# Patient Record
Sex: FEMALE | Race: WHITE | NOT HISPANIC OR LATINO | Employment: FULL TIME | URBAN - METROPOLITAN AREA
[De-identification: names, ages, dates, MRNs, and addresses within clinical notes are randomized per-mention and may not be internally consistent; named-entity substitution may affect disease eponyms.]

---

## 2017-01-30 ENCOUNTER — APPOINTMENT (OUTPATIENT)
Dept: WOMENS IMAGING | Facility: HOSPITAL | Age: 52
End: 2017-01-30

## 2017-01-30 PROCEDURE — 77063 BREAST TOMOSYNTHESIS BI: CPT | Performed by: RADIOLOGY

## 2017-01-30 PROCEDURE — 77067 SCR MAMMO BI INCL CAD: CPT | Performed by: RADIOLOGY

## 2017-11-28 ENCOUNTER — TRANSCRIBE ORDERS (OUTPATIENT)
Dept: GASTROENTEROLOGY | Facility: CLINIC | Age: 52
End: 2017-11-28

## 2017-11-28 DIAGNOSIS — Z12.11 ENCOUNTER FOR SCREENING FOR MALIGNANT NEOPLASM OF COLON: Primary | ICD-10-CM

## 2018-01-16 PROBLEM — Z12.11 ENCOUNTER FOR SCREENING FOR MALIGNANT NEOPLASM OF COLON: Status: ACTIVE | Noted: 2018-01-16

## 2018-01-31 ENCOUNTER — APPOINTMENT (OUTPATIENT)
Dept: WOMENS IMAGING | Facility: HOSPITAL | Age: 53
End: 2018-01-31

## 2018-01-31 PROCEDURE — 77067 SCR MAMMO BI INCL CAD: CPT | Performed by: RADIOLOGY

## 2018-03-30 ENCOUNTER — HOSPITAL ENCOUNTER (OUTPATIENT)
Facility: HOSPITAL | Age: 53
Setting detail: HOSPITAL OUTPATIENT SURGERY
Discharge: HOME | End: 2018-03-30
Attending: INTERNAL MEDICINE | Admitting: INTERNAL MEDICINE

## 2018-03-30 ENCOUNTER — ANESTHESIA (OUTPATIENT)
Dept: GASTROENTEROLOGY | Facility: HOSPITAL | Age: 53
End: 2018-03-30

## 2018-03-30 ENCOUNTER — ANESTHESIA EVENT (OUTPATIENT)
Dept: GASTROENTEROLOGY | Facility: HOSPITAL | Age: 53
End: 2018-03-30

## 2018-03-30 VITALS
WEIGHT: 140.4 LBS | TEMPERATURE: 98.4 F | HEIGHT: 63 IN | SYSTOLIC BLOOD PRESSURE: 131 MMHG | BODY MASS INDEX: 24.88 KG/M2 | OXYGEN SATURATION: 100 % | RESPIRATION RATE: 16 BRPM | DIASTOLIC BLOOD PRESSURE: 85 MMHG | HEART RATE: 76 BPM

## 2018-03-30 DIAGNOSIS — Z12.11 ENCOUNTER FOR SCREENING FOR MALIGNANT NEOPLASM OF COLON: ICD-10-CM

## 2018-03-30 PROCEDURE — 45385 COLONOSCOPY W/LESION REMOVAL: CPT | Performed by: INTERNAL MEDICINE

## 2018-03-30 PROCEDURE — 88305 TISSUE EXAM BY PATHOLOGIST: CPT | Performed by: INTERNAL MEDICINE

## 2018-03-30 PROCEDURE — 25010000002 PROPOFOL 10 MG/ML EMULSION: Performed by: ANESTHESIOLOGY

## 2018-03-30 PROCEDURE — S0260 H&P FOR SURGERY: HCPCS | Performed by: INTERNAL MEDICINE

## 2018-03-30 RX ORDER — LIDOCAINE HYDROCHLORIDE 20 MG/ML
INJECTION, SOLUTION INFILTRATION; PERINEURAL AS NEEDED
Status: DISCONTINUED | OUTPATIENT
Start: 2018-03-30 | End: 2018-03-30 | Stop reason: SURG

## 2018-03-30 RX ORDER — PROPOFOL 10 MG/ML
VIAL (ML) INTRAVENOUS CONTINUOUS PRN
Status: DISCONTINUED | OUTPATIENT
Start: 2018-03-30 | End: 2018-03-30 | Stop reason: SURG

## 2018-03-30 RX ORDER — SODIUM CHLORIDE, SODIUM LACTATE, POTASSIUM CHLORIDE, CALCIUM CHLORIDE 600; 310; 30; 20 MG/100ML; MG/100ML; MG/100ML; MG/100ML
1000 INJECTION, SOLUTION INTRAVENOUS CONTINUOUS PRN
Status: DISCONTINUED | OUTPATIENT
Start: 2018-03-30 | End: 2018-03-30 | Stop reason: HOSPADM

## 2018-03-30 RX ORDER — PROPOFOL 10 MG/ML
VIAL (ML) INTRAVENOUS AS NEEDED
Status: DISCONTINUED | OUTPATIENT
Start: 2018-03-30 | End: 2018-03-30 | Stop reason: SURG

## 2018-03-30 RX ORDER — LIDOCAINE HYDROCHLORIDE 10 MG/ML
0.5 INJECTION, SOLUTION INFILTRATION; PERINEURAL ONCE AS NEEDED
Status: DISCONTINUED | OUTPATIENT
Start: 2018-03-30 | End: 2018-03-30 | Stop reason: HOSPADM

## 2018-03-30 RX ORDER — SODIUM CHLORIDE 0.9 % (FLUSH) 0.9 %
3 SYRINGE (ML) INJECTION AS NEEDED
Status: DISCONTINUED | OUTPATIENT
Start: 2018-03-30 | End: 2018-03-30 | Stop reason: HOSPADM

## 2018-03-30 RX ADMIN — SODIUM CHLORIDE, POTASSIUM CHLORIDE, SODIUM LACTATE AND CALCIUM CHLORIDE 1000 ML: 600; 310; 30; 20 INJECTION, SOLUTION INTRAVENOUS at 08:05

## 2018-03-30 RX ADMIN — PROPOFOL 150 MG: 10 INJECTION, EMULSION INTRAVENOUS at 08:18

## 2018-03-30 RX ADMIN — PROPOFOL 140 MCG/KG/MIN: 10 INJECTION, EMULSION INTRAVENOUS at 08:18

## 2018-03-30 RX ADMIN — LIDOCAINE HYDROCHLORIDE 50 MG: 20 INJECTION, SOLUTION INFILTRATION; PERINEURAL at 08:18

## 2018-03-30 NOTE — ANESTHESIA PREPROCEDURE EVALUATION
Anesthesia Evaluation     Patient summary reviewed                Airway   Mallampati: III  TM distance: >3 FB  Neck ROM: full  No difficulty expected  Dental - normal exam     Pulmonary     breath sounds clear to auscultation  Cardiovascular   Exercise tolerance: good (4-7 METS)    Rhythm: regular  Rate: normal        Neuro/Psych  GI/Hepatic/Renal/Endo      Musculoskeletal     Abdominal    Substance History      OB/GYN          Other                        Anesthesia Plan    ASA 1     MAC     intravenous induction   Anesthetic plan and risks discussed with patient.

## 2018-03-30 NOTE — ANESTHESIA POSTPROCEDURE EVALUATION
Patient: Ling Haas    Procedure Summary     Date:  03/30/18 Room / Location:   [facility]    Anesthesia Start:  0814 Anesthesia Stop:  0845    Procedure:  COLONOSCOPY TO CECUM/TI TO POLYPECTOMY (HOT SNARE) (N/A ) Diagnosis:       Colon polyps      Diverticulosis      Internal hemorrhoids without complication      (Encounter for screening for malignant neoplasm of colon [Z12.11])    Surgeon:  Jameel Artis MD Provider:  Lazarus Kent MD    Anesthesia Type:  MAC ASA Status:  1          Anesthesia Type: MAC  Last vitals  BP   131/85 (03/30/18 0909)   Temp   36.9 °C (98.4 °F) (03/30/18 0750)   Pulse   76 (03/30/18 0909)   Resp   16 (03/30/18 0909)     SpO2   100 % (03/30/18 0909)     Post Anesthesia Care and Evaluation    Patient location during evaluation: bedside  Patient participation: complete - patient participated  Level of consciousness: awake and alert  Pain score: 0  Pain management: adequate  Airway patency: patent  Anesthetic complications: No anesthetic complications  PONV Status: none  Cardiovascular status: acceptable  Respiratory status: acceptable  Hydration status: acceptable  Post Neuraxial Block status: Motor and sensory function returned to baseline

## 2018-04-02 LAB
CYTO UR: NORMAL
LAB AP CASE REPORT: NORMAL
Lab: NORMAL
PATH REPORT.FINAL DX SPEC: NORMAL
PATH REPORT.GROSS SPEC: NORMAL

## 2018-05-11 ENCOUNTER — TELEPHONE (OUTPATIENT)
Dept: GASTROENTEROLOGY | Facility: CLINIC | Age: 53
End: 2018-05-11

## 2018-05-11 NOTE — TELEPHONE ENCOUNTER
Patient called, advised according to Dr. Artis her biopsies were benign and will need to repeat her colonoscopy in 5 years. She verb understanding. Patient's health maintenance record updated to reflect the need to repeat colonoscopy in  Years.

## 2018-05-11 NOTE — TELEPHONE ENCOUNTER
----- Message from Jameel Artis MD sent at 5/7/2018  8:09 PM EDT -----  Benign polyps, repeat colon 5-6 years

## 2019-03-01 ENCOUNTER — APPOINTMENT (OUTPATIENT)
Dept: WOMENS IMAGING | Facility: HOSPITAL | Age: 54
End: 2019-03-01

## 2019-03-01 PROCEDURE — 77067 SCR MAMMO BI INCL CAD: CPT | Performed by: RADIOLOGY

## 2019-03-01 PROCEDURE — 77063 BREAST TOMOSYNTHESIS BI: CPT | Performed by: RADIOLOGY

## 2019-05-17 ENCOUNTER — APPOINTMENT (OUTPATIENT)
Dept: WOMENS IMAGING | Facility: HOSPITAL | Age: 54
End: 2019-05-17

## 2019-05-17 PROCEDURE — 77065 DX MAMMO INCL CAD UNI: CPT | Performed by: RADIOLOGY

## 2019-05-17 PROCEDURE — 76641 ULTRASOUND BREAST COMPLETE: CPT | Performed by: RADIOLOGY

## 2019-05-17 PROCEDURE — G0279 TOMOSYNTHESIS, MAMMO: HCPCS | Performed by: RADIOLOGY

## 2019-05-17 PROCEDURE — 77061 BREAST TOMOSYNTHESIS UNI: CPT | Performed by: RADIOLOGY

## 2019-11-12 ENCOUNTER — HOSPITAL ENCOUNTER (OUTPATIENT)
Dept: CARDIOLOGY | Facility: HOSPITAL | Age: 54
Discharge: HOME | End: 2019-11-12
Admitting: INTERNAL MEDICINE

## 2019-11-12 ENCOUNTER — HOSPITAL ENCOUNTER (OUTPATIENT)
Dept: CARDIOLOGY | Facility: HOSPITAL | Age: 54
Discharge: HOME | End: 2019-11-12

## 2019-11-12 VITALS
WEIGHT: 132 LBS | SYSTOLIC BLOOD PRESSURE: 138 MMHG | BODY MASS INDEX: 24.29 KG/M2 | HEART RATE: 72 BPM | HEIGHT: 62 IN | DIASTOLIC BLOOD PRESSURE: 91 MMHG

## 2019-11-12 VITALS
WEIGHT: 132 LBS | BODY MASS INDEX: 24.29 KG/M2 | SYSTOLIC BLOOD PRESSURE: 138 MMHG | HEIGHT: 62 IN | DIASTOLIC BLOOD PRESSURE: 91 MMHG | HEART RATE: 77 BPM | OXYGEN SATURATION: 100 %

## 2019-11-12 DIAGNOSIS — R07.9 CHEST PAIN, UNSPECIFIED TYPE: ICD-10-CM

## 2019-11-12 DIAGNOSIS — Z99.89 OSA ON CPAP: Primary | Chronic | ICD-10-CM

## 2019-11-12 DIAGNOSIS — G47.33 OSA ON CPAP: Primary | Chronic | ICD-10-CM

## 2019-11-12 LAB
ALBUMIN SERPL-MCNC: 4.6 G/DL (ref 3.5–5.2)
ALBUMIN/GLOB SERPL: 1.6 G/DL
ALP SERPL-CCNC: 123 U/L (ref 39–117)
ALT SERPL W P-5'-P-CCNC: 7 U/L (ref 1–33)
ANION GAP SERPL CALCULATED.3IONS-SCNC: 7.5 MMOL/L (ref 5–15)
AORTIC ROOT ANNULUS: 1.9 CM
ASCENDING AORTA: 3 CM
AST SERPL-CCNC: 15 U/L (ref 1–32)
BASOPHILS # BLD AUTO: 0.05 10*3/MM3 (ref 0–0.2)
BASOPHILS NFR BLD AUTO: 0.9 % (ref 0–1.5)
BH CV ECHO MEAS - ACS: 2.1 CM
BH CV ECHO MEAS - AO MAX PG (FULL): 2.8 MMHG
BH CV ECHO MEAS - AO MAX PG: 6.2 MMHG
BH CV ECHO MEAS - AO MEAN PG (FULL): 1.1 MMHG
BH CV ECHO MEAS - AO MEAN PG: 2.6 MMHG
BH CV ECHO MEAS - AO V2 MAX: 124.3 CM/SEC
BH CV ECHO MEAS - AO V2 MEAN: 71.2 CM/SEC
BH CV ECHO MEAS - AO V2 VTI: 24.1 CM
BH CV ECHO MEAS - ASC AORTA: 3 CM
BH CV ECHO MEAS - AVA(I,A): 2 CM^2
BH CV ECHO MEAS - AVA(I,D): 2 CM^2
BH CV ECHO MEAS - AVA(V,A): 1.8 CM^2
BH CV ECHO MEAS - AVA(V,D): 1.8 CM^2
BH CV ECHO MEAS - BSA(HAYCOCK): 1.6 M^2
BH CV ECHO MEAS - BSA: 1.6 M^2
BH CV ECHO MEAS - BZI_BMI: 24.1 KILOGRAMS/M^2
BH CV ECHO MEAS - BZI_METRIC_HEIGHT: 157.5 CM
BH CV ECHO MEAS - BZI_METRIC_WEIGHT: 59.9 KG
BH CV ECHO MEAS - EDV(MOD-SP2): 45 ML
BH CV ECHO MEAS - EDV(MOD-SP4): 59 ML
BH CV ECHO MEAS - EDV(TEICH): 101.9 ML
BH CV ECHO MEAS - EF(CUBED): 89.7 %
BH CV ECHO MEAS - EF(MOD-SP2): 62.2 %
BH CV ECHO MEAS - EF(MOD-SP4): 67.8 %
BH CV ECHO MEAS - EF(TEICH): 84.2 %
BH CV ECHO MEAS - ESV(MOD-SP2): 17 ML
BH CV ECHO MEAS - ESV(MOD-SP4): 19 ML
BH CV ECHO MEAS - ESV(TEICH): 16.1 ML
BH CV ECHO MEAS - FS: 53.2 %
BH CV ECHO MEAS - IVS/LVPW: 1.1
BH CV ECHO MEAS - IVSD: 0.9 CM
BH CV ECHO MEAS - LAT PEAK E' VEL: 12 CM/SEC
BH CV ECHO MEAS - LV DIASTOLIC VOL/BSA (35-75): 36.8 ML/M^2
BH CV ECHO MEAS - LV MASS(C)D: 135 GRAMS
BH CV ECHO MEAS - LV MASS(C)DI: 84.3 GRAMS/M^2
BH CV ECHO MEAS - LV MAX PG: 3.3 MMHG
BH CV ECHO MEAS - LV MEAN PG: 1.5 MMHG
BH CV ECHO MEAS - LV SYSTOLIC VOL/BSA (12-30): 11.9 ML/M^2
BH CV ECHO MEAS - LV V1 MAX: 91.3 CM/SEC
BH CV ECHO MEAS - LV V1 MEAN: 53.4 CM/SEC
BH CV ECHO MEAS - LV V1 VTI: 19.5 CM
BH CV ECHO MEAS - LVIDD: 4.7 CM
BH CV ECHO MEAS - LVIDS: 2.2 CM
BH CV ECHO MEAS - LVLD AP2: 7.4 CM
BH CV ECHO MEAS - LVLD AP4: 7.1 CM
BH CV ECHO MEAS - LVLS AP2: 6.9 CM
BH CV ECHO MEAS - LVLS AP4: 6.1 CM
BH CV ECHO MEAS - LVOT AREA (M): 2.5 CM^2
BH CV ECHO MEAS - LVOT AREA: 2.5 CM^2
BH CV ECHO MEAS - LVOT DIAM: 1.8 CM
BH CV ECHO MEAS - LVPWD: 0.83 CM
BH CV ECHO MEAS - MED PEAK E' VEL: 12 CM/SEC
BH CV ECHO MEAS - MV A DUR: 0.13 SEC
BH CV ECHO MEAS - MV A MAX VEL: 93 CM/SEC
BH CV ECHO MEAS - MV DEC SLOPE: 284.4 CM/SEC^2
BH CV ECHO MEAS - MV DEC TIME: 0.26 SEC
BH CV ECHO MEAS - MV E MAX VEL: 67.8 CM/SEC
BH CV ECHO MEAS - MV E/A: 0.73
BH CV ECHO MEAS - MV MAX PG: 4.7 MMHG
BH CV ECHO MEAS - MV MEAN PG: 2 MMHG
BH CV ECHO MEAS - MV P1/2T MAX VEL: 68.9 CM/SEC
BH CV ECHO MEAS - MV P1/2T: 71 MSEC
BH CV ECHO MEAS - MV V2 MAX: 108.6 CM/SEC
BH CV ECHO MEAS - MV V2 MEAN: 66.7 CM/SEC
BH CV ECHO MEAS - MV V2 VTI: 28.7 CM
BH CV ECHO MEAS - MVA P1/2T LCG: 3.2 CM^2
BH CV ECHO MEAS - MVA(P1/2T): 3.1 CM^2
BH CV ECHO MEAS - MVA(VTI): 1.7 CM^2
BH CV ECHO MEAS - PA ACC TIME: 0.17 SEC
BH CV ECHO MEAS - PA MAX PG (FULL): 0.45 MMHG
BH CV ECHO MEAS - PA MAX PG: 2.8 MMHG
BH CV ECHO MEAS - PA PR(ACCEL): 1.4 MMHG
BH CV ECHO MEAS - PA V2 MAX: 84.2 CM/SEC
BH CV ECHO MEAS - PULM A REVS DUR: 0.12 SEC
BH CV ECHO MEAS - PULM A REVS VEL: 29 CM/SEC
BH CV ECHO MEAS - PULM DIAS VEL: 37.2 CM/SEC
BH CV ECHO MEAS - PULM S/D: 1.4
BH CV ECHO MEAS - PULM SYS VEL: 52 CM/SEC
BH CV ECHO MEAS - PVA(V,A): 2.5 CM^2
BH CV ECHO MEAS - PVA(V,D): 2.5 CM^2
BH CV ECHO MEAS - QP/QS: 0.98
BH CV ECHO MEAS - RV MAX PG: 2.4 MMHG
BH CV ECHO MEAS - RV MEAN PG: 1.3 MMHG
BH CV ECHO MEAS - RV V1 MAX: 77.1 CM/SEC
BH CV ECHO MEAS - RV V1 MEAN: 52.3 CM/SEC
BH CV ECHO MEAS - RV V1 VTI: 17.3 CM
BH CV ECHO MEAS - RVOT AREA: 2.8 CM^2
BH CV ECHO MEAS - RVOT DIAM: 1.9 CM
BH CV ECHO MEAS - SI(CUBED): 57.8 ML/M^2
BH CV ECHO MEAS - SI(LVOT): 30.5 ML/M^2
BH CV ECHO MEAS - SI(MOD-SP2): 17.5 ML/M^2
BH CV ECHO MEAS - SI(MOD-SP4): 25 ML/M^2
BH CV ECHO MEAS - SI(TEICH): 53.6 ML/M^2
BH CV ECHO MEAS - SV(CUBED): 92.7 ML
BH CV ECHO MEAS - SV(LVOT): 48.9 ML
BH CV ECHO MEAS - SV(MOD-SP2): 28 ML
BH CV ECHO MEAS - SV(MOD-SP4): 40 ML
BH CV ECHO MEAS - SV(RVOT): 47.7 ML
BH CV ECHO MEAS - SV(TEICH): 85.8 ML
BH CV ECHO MEAS - TAPSE (>1.6): 2.4 CM2
BH CV ECHO MEASUREMENTS AVERAGE E/E' RATIO: 5.65
BH CV XLRA - RV BASE: 2.6 CM
BH CV XLRA - RV LENGTH: 6.5 CM
BH CV XLRA - RV MID: 2.2 CM
BH CV XLRA - TDI S': 15 CM/SEC
BILIRUB SERPL-MCNC: 0.7 MG/DL (ref 0.2–1.2)
BUN BLD-MCNC: 14 MG/DL (ref 6–20)
BUN/CREAT SERPL: 19.2 (ref 7–25)
CALCIUM SPEC-SCNC: 9.2 MG/DL (ref 8.6–10.5)
CHLORIDE SERPL-SCNC: 101 MMOL/L (ref 98–107)
CO2 SERPL-SCNC: 32.5 MMOL/L (ref 22–29)
CREAT BLD-MCNC: 0.73 MG/DL (ref 0.57–1)
D DIMER PPP FEU-MCNC: <0.27 MCGFEU/ML (ref 0–0.49)
DEPRECATED RDW RBC AUTO: 40.4 FL (ref 37–54)
EOSINOPHIL # BLD AUTO: 0.09 10*3/MM3 (ref 0–0.4)
EOSINOPHIL NFR BLD AUTO: 1.6 % (ref 0.3–6.2)
ERYTHROCYTE [DISTWIDTH] IN BLOOD BY AUTOMATED COUNT: 12.7 % (ref 12.3–15.4)
GFR SERPL CREATININE-BSD FRML MDRD: 83 ML/MIN/1.73
GLOBULIN UR ELPH-MCNC: 2.9 GM/DL
GLUCOSE BLD-MCNC: 87 MG/DL (ref 65–99)
HCT VFR BLD AUTO: 40 % (ref 34–46.6)
HGB BLD-MCNC: 12.9 G/DL (ref 12–15.9)
IMM GRANULOCYTES # BLD AUTO: 0.01 10*3/MM3 (ref 0–0.05)
IMM GRANULOCYTES NFR BLD AUTO: 0.2 % (ref 0–0.5)
LEFT ATRIUM VOLUME INDEX: 17 ML/M2
LYMPHOCYTES # BLD AUTO: 1.65 10*3/MM3 (ref 0.7–3.1)
LYMPHOCYTES NFR BLD AUTO: 30.1 % (ref 19.6–45.3)
MAXIMAL PREDICTED HEART RATE: 165 BPM
MCH RBC QN AUTO: 28.1 PG (ref 26.6–33)
MCHC RBC AUTO-ENTMCNC: 32.3 G/DL (ref 31.5–35.7)
MCV RBC AUTO: 87.1 FL (ref 79–97)
MONOCYTES # BLD AUTO: 0.38 10*3/MM3 (ref 0.1–0.9)
MONOCYTES NFR BLD AUTO: 6.9 % (ref 5–12)
NEUTROPHILS # BLD AUTO: 3.31 10*3/MM3 (ref 1.7–7)
NEUTROPHILS NFR BLD AUTO: 60.3 % (ref 42.7–76)
NRBC BLD AUTO-RTO: 0 /100 WBC (ref 0–0.2)
NT-PROBNP SERPL-MCNC: 11.4 PG/ML (ref 5–900)
PLATELET # BLD AUTO: 373 10*3/MM3 (ref 140–450)
PMV BLD AUTO: 9.5 FL (ref 6–12)
POTASSIUM BLD-SCNC: 3.9 MMOL/L (ref 3.5–5.2)
PROT SERPL-MCNC: 7.5 G/DL (ref 6–8.5)
RBC # BLD AUTO: 4.59 10*6/MM3 (ref 3.77–5.28)
SINUS: 3.5 CM
SODIUM BLD-SCNC: 141 MMOL/L (ref 136–145)
STJ: 2.9 CM
STRESS TARGET HR: 140 BPM
TROPONIN T SERPL-MCNC: <0.01 NG/ML (ref 0–0.03)
WBC NRBC COR # BLD: 5.49 10*3/MM3 (ref 3.4–10.8)

## 2019-11-12 PROCEDURE — 94760 N-INVAS EAR/PLS OXIMETRY 1: CPT

## 2019-11-12 PROCEDURE — 83880 ASSAY OF NATRIURETIC PEPTIDE: CPT | Performed by: INTERNAL MEDICINE

## 2019-11-12 PROCEDURE — 93005 ELECTROCARDIOGRAM TRACING: CPT

## 2019-11-12 PROCEDURE — 80053 COMPREHEN METABOLIC PANEL: CPT | Performed by: INTERNAL MEDICINE

## 2019-11-12 PROCEDURE — 36415 COLL VENOUS BLD VENIPUNCTURE: CPT

## 2019-11-12 PROCEDURE — 93005 ELECTROCARDIOGRAM TRACING: CPT | Performed by: INTERNAL MEDICINE

## 2019-11-12 PROCEDURE — 85025 COMPLETE CBC W/AUTO DIFF WBC: CPT | Performed by: INTERNAL MEDICINE

## 2019-11-12 PROCEDURE — 93306 TTE W/DOPPLER COMPLETE: CPT

## 2019-11-12 PROCEDURE — 99204 OFFICE O/P NEW MOD 45 MIN: CPT | Performed by: INTERNAL MEDICINE

## 2019-11-12 PROCEDURE — 84484 ASSAY OF TROPONIN QUANT: CPT | Performed by: INTERNAL MEDICINE

## 2019-11-12 PROCEDURE — 93306 TTE W/DOPPLER COMPLETE: CPT | Performed by: INTERNAL MEDICINE

## 2019-11-12 PROCEDURE — 85379 FIBRIN DEGRADATION QUANT: CPT | Performed by: INTERNAL MEDICINE

## 2019-11-12 RX ORDER — SODIUM CHLORIDE 0.9 % (FLUSH) 0.9 %
10 SYRINGE (ML) INJECTION AS NEEDED
Status: SHIPPED | OUTPATIENT
Start: 2019-11-12

## 2019-11-12 RX ORDER — NITROGLYCERIN 0.4 MG/1
0.4 TABLET SUBLINGUAL
Status: SHIPPED | OUTPATIENT
Start: 2019-11-12

## 2019-11-12 NOTE — PROGRESS NOTES
[facility]      Patient Name: Ling Haas  :1965  Age: 54 y.o.  Encounter Provider:  MITCH FOSTER      Chief Complaint:   Chief Complaint   Patient presents with   • Chest Pain     Patient sent from primary care provider's office with complaints of back & chest pain         HPI  Ling Haas is a 54 y.o. female past medical history of SHANON on CPAP, GERD who presents for evaluation of chest discomfort.  Patient noticed about 6 days ago an episode of right shoulder pain that spread across the back and lasted for a few hours.  This came on at rest and resolved spontaneously.  Patient did not seek medical attention for this.  Since Friday she has had a substernal pressure that radiates toward the mid sternum that came on at rest.  She states that getting up and moving around makes it feel better.  She denies any aggravating factors.  No associated nausea shortness of air or diaphoresis.  She is normally very active and denies any limiting symptoms during activity.  Specifically usually no chest pain shortness of air palpitations.  No dizziness or syncope.  No orthopnea PND or edema.  She denies tobacco alcohol or illicit drug use.  Family history of grandmother who suffered a heart attack at age 50.      The following portions of the patient's history were reviewed and updated as appropriate: allergies, current medications, past family history, past medical history, past social history, past surgical history and problem list.      Review of Systems   Constitution: Negative for chills and fever.   HENT: Negative for hoarse voice and sore throat.    Eyes: Negative for double vision and photophobia.   Cardiovascular: Positive for chest pain. Negative for leg swelling, near-syncope, orthopnea, palpitations, paroxysmal nocturnal dyspnea and syncope.   Respiratory: Negative for cough and wheezing.    Skin: Negative for poor wound healing and rash.   Musculoskeletal: Positive for back pain.  "Negative for arthritis and joint swelling.   Gastrointestinal: Negative for bloating, abdominal pain, hematemesis and hematochezia.   Neurological: Negative for dizziness and focal weakness.   Psychiatric/Behavioral: Negative for depression and suicidal ideas.       OBJECTIVE:   Vital Signs  Vitals:    11/12/19 1402   BP: 138/91   Pulse: 77   SpO2: 100%     Estimated body mass index is 24.14 kg/m² as calculated from the following:    Height as of this encounter: 157.5 cm (62\").    Weight as of this encounter: 59.9 kg (132 lb).    Physical Exam   Constitutional: She is oriented to person, place, and time. She appears well-developed and well-nourished.   HENT:   Head: Normocephalic and atraumatic.   Eyes: Conjunctivae are normal. Pupils are equal, round, and reactive to light.   Neck: No JVD present. No thyromegaly present.   Cardiovascular: Exam reveals no gallop and no friction rub.   No murmur heard.  Pulmonary/Chest: No respiratory distress. She exhibits no tenderness.   Abdominal: Bowel sounds are normal. She exhibits no distension.   Musculoskeletal: She exhibits no edema or tenderness.   Neurological: She is alert and oriented to person, place, and time.   Skin: No rash noted. No erythema.   Psychiatric: She has a normal mood and affect. Judgment normal.   Vitals reviewed.        ECG 12 Lead  Date/Time: 11/12/2019 2:37 PM  Performed by: Lazarus Sánchez Jr., MD  Authorized by: Lazarus Sánchez Jr., MD   Comparison: not compared with previous ECG   Previous ECG: no previous ECG available  Rhythm: sinus rhythm    Clinical impression: normal ECG                  ASSESSMENT:      Diagnosis Plan   1. SHANON on autoCPAP     2. Chest pain, unspecified type  Cardiac Monitoring    Vital Signs - Once    Vital Signs - As Needed    Pulse Oximetry    Oxygen Therapy- Nasal Cannula; Titrate for SPO2: 92%, equal to or greater than    Insert Peripheral IV    sodium chloride 0.9 % flush 10 mL    nitroglycerin (NITROSTAT) SL tablet 0.4 " mg    NPO Diet    Bathroom Privileges With Assistance    CBC & Differential    Comprehensive Metabolic Panel    Troponin T    D-Dimer    proBNP    Cardiac Monitoring    Vital Signs - Once    Insert Peripheral IV    NPO Diet    Bathroom Privileges With Assistance    Troponin T    Troponin T    D-Dimer    D-Dimer    proBNP    proBNP    Adult Transthoracic Echo Complete W/ Cont if Necessary Per Protocol         PLAN OF CARE:     1. Chest pain -atypical and constant since Friday.  Check cardiac biomarkers.  No ischemic changes on EKG.  We will check an echocardiogram.  If no overt cardiac pathology will give trial of NSAIDs.  Continue famotidine for GERD.  2. GERD -as above  3. SHANON -continue CPAP    Return to clinic 4 weeks           Discharge Medications      ASK your doctor about these medications      Instructions Start Date   AMETHIA PO   Oral      azithromycin 250 MG tablet  Commonly known as:  ZITHROMAX   Take 2 tablets the first day, then 1 tablet daily for 4 days.      famotidine 20 MG tablet  Commonly known as:  PEPCID   20 mg, Oral, 2 Times Daily      fexofenadine 180 MG tablet  Commonly known as:  ALLEGRA   180 mg, Oral, Daily      FLUoxetine 20 MG capsule  Commonly known as:  PROzac   20 mg, Oral, Daily      HAVRIX 1440 EL U/ML vaccine  Generic drug:  hepatitis A   Intramuscular      HAVRIX 1440 EL U/ML vaccine  Generic drug:  hepatitis A   Intramuscular      IRON PO   Oral      VITAMIN D PO   Oral             Thank you for allowing me to participate in the care of your patient,      Sincerely,   Lazarus Sánchez Jr, MD  [facility]  11/12/19  2:34 PM

## 2019-11-12 NOTE — PATIENT INSTRUCTIONS
Begin any NSAIDs as directed by Dr. Sánchez.  Follow-up with your primary care provider as necessary.

## 2020-03-02 ENCOUNTER — APPOINTMENT (OUTPATIENT)
Dept: WOMENS IMAGING | Facility: HOSPITAL | Age: 55
End: 2020-03-02

## 2020-03-02 PROCEDURE — 77067 SCR MAMMO BI INCL CAD: CPT | Performed by: RADIOLOGY

## 2020-03-02 PROCEDURE — 77063 BREAST TOMOSYNTHESIS BI: CPT | Performed by: RADIOLOGY

## 2020-12-23 ENCOUNTER — IMMUNIZATION (OUTPATIENT)
Dept: VACCINE CLINIC | Facility: HOSPITAL | Age: 55
End: 2020-12-23

## 2020-12-23 PROCEDURE — 91300 HC SARSCOV02 VAC 30MCG/0.3ML IM: CPT | Performed by: INTERNAL MEDICINE

## 2020-12-23 PROCEDURE — 0001A: CPT | Performed by: INTERNAL MEDICINE

## 2021-01-13 ENCOUNTER — IMMUNIZATION (OUTPATIENT)
Dept: VACCINE CLINIC | Facility: HOSPITAL | Age: 56
End: 2021-01-13

## 2021-01-13 PROCEDURE — 0002A: CPT | Performed by: INTERNAL MEDICINE

## 2021-01-13 PROCEDURE — 91300 HC SARSCOV02 VAC 30MCG/0.3ML IM: CPT | Performed by: INTERNAL MEDICINE

## 2021-01-13 PROCEDURE — 0001A: CPT | Performed by: INTERNAL MEDICINE

## 2021-04-20 ENCOUNTER — APPOINTMENT (OUTPATIENT)
Dept: WOMENS IMAGING | Facility: HOSPITAL | Age: 56
End: 2021-04-20

## 2021-04-20 PROCEDURE — 77066 DX MAMMO INCL CAD BI: CPT | Performed by: RADIOLOGY

## 2021-04-20 PROCEDURE — 77062 BREAST TOMOSYNTHESIS BI: CPT | Performed by: RADIOLOGY

## 2021-04-28 ENCOUNTER — TELEPHONE (OUTPATIENT)
Dept: SURGERY | Facility: CLINIC | Age: 56
End: 2021-04-28

## 2021-04-28 NOTE — TELEPHONE ENCOUNTER
New patient appointment with Germaine Cruz NP is scheduled on 6/14/2021 @ 1:00pm.    Called and left message with patient about appointment time, patient to call back and confirm.    Sent patient a reminder letter in the mail.

## 2021-06-07 NOTE — PROGRESS NOTES
[facility]     Referring Provider: ERICKA Ortiz     Chief complaint: Right inverted nipple     HPI: Ms. Ling Haas is a 56 yo woman, seen at the request of ERICKA Ortiz, for intermittent right nipple inversion.    I personally reviewed her records and summarized her relevant breast history/imaging:    She has no personal history of breast procedures.  She was diagnosed with early pancreatic cancer in .        She has a family history of breast cancer in her paternal aunt diagnosed at age 65, paternal cousin age 42, , paternal cousin 40's.   She denies any family history of ovarian cancer.     2021: Clinic visit with ERICKA Goins  Presents for well woman preventative exam.  Still has inverted nipple, had x-ray testing 2 years ago, patient concerned.  Clinical breast exam negative for abnormality  Concerned about inverted right nipple.  Started about 2 years ago she has had testing everything was okay, states she has been worrying more about it.  We will send to Dr. Morales at [facility] for consult.  Scheduled mammogram today.    2019: Right diagnostic [facility] with tomosynthesis[facility]  There are scattered areas of fibroglandular density.  As seen on mammogram, there is suggestion of horizontal crease in the mid nipple, usually benign finding.  No suspicious masses, suspicious microcalcifications or areas of architectural distortion are identified.  No retraction of the nipple or skin thickening is seen.  Ultrasound was performed for more complete evaluation.  Right breast complete ultrasound  There is no evidence of solid mass or abnormal cystic elements.  All 4 quadrants of the breast, axilla, retroareolar region were imaged.  Impression:  Area in the right breast is benign negative.  In view of negative findings, patient symptoms can be followed on a clinical basis,  Follow-up mammogram 1 year is  recommended.  BI-RADS Category 2    3/2/2020: Bilateral screening mammogram with tomosynthesis at [facility]  Scattered areas of fibroglandular density.  No suspicious masses, significant calcifications or other abnormalities are seen.    Impression:  There is no mammographic evidence of malignancy.  Screening mammogram 1 year is recommended.  BI-RADS Category 1    4/20/2021: Bilateral diagnostic mammogram with tomosynthesis at [facility]  Scattered areas of fibroglandular density.  There are areas of asymmetric breast tissue seen in both breasts.  No suspicious masses, suspicious microcalcifications or areas of architectural distortion are identified.  There is been no significant change from prior exam.  Impression:  Areas of asymmetric breast tissue in both breasts are benign negative.  Screening mammogram 1 year is recommended.  BI-RADS Category 2        Today she presents with intermittent right nipple inversion that occurs when she is warm.  She notes that first thing in the AM, her right nipple is deeply creased and as she becomes more active and certainly colder, it returns to normal, matching her left nipple.  She denies any breast lumps, pain, skin changes, or nipple discharge.  She has seen these changes over the past 2 years, has had negative imaging with US 2 years ago, no US since.  She denies any prior history of abnormal mammograms or breast biopsies.     She reports that she is otherwise healthy.      She was by herself in clinic today.      Review of Systems   HENT:   Positive for tinnitus.    Musculoskeletal: Positive for back pain and flank pain.   Neurological: Positive for headaches.   Psychiatric/Behavioral: Positive for depression. The patient is nervous/anxious.    All other systems reviewed and are negative.      Medications:    Current Outpatient Medications:   •  aspirin-acetaminophen-caffeine (EXCEDRIN MIGRAINE) 250-250-65 MG per tablet, Take  by mouth.,  Disp: , Rfl:   •  cholecalciferol (Cholecalciferol) 25 MCG (1000 UT) tablet, Take 1,000 Units by mouth Daily., Disp: , Rfl:   •  Cholecalciferol (VITAMIN D PO), Take  by mouth., Disp: , Rfl:   •  escitalopram (LEXAPRO) 20 MG tablet, Take 20 mg by mouth Daily., Disp: , Rfl:   •  famotidine (PEPCID) 20 MG tablet, Take 20 mg by mouth 2 (Two) Times a Day., Disp: , Rfl:   •  ferrous sulfate 325 (65 FE) MG tablet, Take 325 mg by mouth Daily., Disp: , Rfl:   •  fexofenadine (ALLEGRA) 180 MG tablet, Take 180 mg by mouth Daily., Disp: , Rfl:   •  ibuprofen (ADVIL,MOTRIN) 200 MG tablet, Take 200 mg by mouth., Disp: , Rfl:   •  IRON PO, Take  by mouth., Disp: , Rfl:     Current Facility-Administered Medications:   •  nitroglycerin (NITROSTAT) SL tablet 0.4 mg, 0.4 mg, Sublingual, Q5 Min PRN, Lazarus Sánchez Jr., MD  •  sodium chloride 0.9 % flush 10 mL, 10 mL, Intravenous, PRN, Lazarus Sánchez Jr., MD    Allergies:  No Known Allergies    Medical history:  Past Medical History:   Diagnosis Date   • Anxiety    • Depression    • GERD (gastroesophageal reflux disease)    • Pancreas cyst    • Sleep apnea        Surgical History:  Past Surgical History:   Procedure Laterality Date   • COLONOSCOPY N/A 3/30/2018    Procedure: COLONOSCOPY TO CECUM/TI TO POLYPECTOMY (HOT SNARE);  Surgeon: Jameel Artis MD;  Location: [facility] ENDOSCOPY;  Service: Gastroenterology   • DISTAL PANCREATECTOMY     • LAPAROSCOPIC CHOLECYSTECTOMY         Family History:  Family History   Problem Relation Age of Onset   • Heart attack Maternal Grandmother    • Breast cancer Paternal Aunt    • Prostate cancer Paternal Uncle    • Colon cancer Maternal Grandfather    • Breast cancer Paternal Cousin    • Breast cancer Paternal Cousin    • Prostate cancer Paternal Uncle        Social History:   Social History     Socioeconomic History   • Marital status:      Spouse name: Not on file   • Number of children: Not on file   • Years of education: Not on file    • Highest education level: Not on file   Tobacco Use   • Smoking status: Never Smoker   Substance and Sexual Activity   • Alcohol use: Yes     Comment: occasional   • Drug use: No   • Sexual activity: Defer     Patient drinks 2 servings of caffeine per day.       GYNECOLOGIC HISTORY:   G: 3. P: 2. AB: 1.  Last menstrual period: age 53  Age at menarche: 10  Age at first childbirth: 35  Lactation/How lon months  Age at menopause: 53  Total years of oral contraceptive use: 30  Total years of hormone replacement therapy: n/a      Physical Exam  Vitals:    21 1247   BP: 124/82   Pulse: 80   SpO2: 98%     ECOG 0 - Asymptomatic  General: NAD, well appearing  Psych: a&o x 3, normal mood and affect  Eyes: EOMI, no scleral icterus  ENMT: neck supple without masses or thyromegaly, mucus membranes moist  Resp: normal effort, CTAB  CV: RRR, no murmurs, no edema   GI: soft, NT, ND  MSK: normal gait, normal ROM in bilateral shoulders  Lymph nodes:  no cervical, supraclavicular or axillary lymphadenopathy  Breast: symmetric, diffusely nodular  Right:  No visible abnormalities on inspection while seated, with arms raised or hands on hips. No masses, skin changes, or nipple abnormalities.  No nipple creasing or inversion noted today.  Left:  No visible abnormalities on inspection while seated, with arms raised or hands on hips. No masses, skin changes, or nipple abnormalities.      Assessment:    1)  55 y.o. F with intermittent right nipple creasing/inversion, negative imaging  2)  Benign breast changes  3)  Family history of breast cancer, lifetime risk 11.2% per tyrer cuzick model    Discussion:  1)  We discussed the intermittent nipple creasing that she has noticed over the past 2 years.  Recent diagnostic mammogram was negative for abnormality, right retroareolar US was last completed in 2019 with negative findings.  She would feel more assured if another right retroareaolar US could confirm no abnormalities today  since she continues to see the intermittent nipple creasing/inversion.  We will arrange for right retroareolar US at [facility] in the near future, I will call her with those results and see her in 3 months for exam.    2)  We discussed fibrocystic change and how this is benign and can sometimes be associated with increased breast density. I reassured her today that she had a normal clinical breast exam and recent normal bilateral imaging. I explained that cords of dense breast tissue or focal areas of fibrocystic change can sometimes feel like a lump on exam. This is common in women like her with heterogeneous and nodular tissue. I encouraged her to become familiar with her own self-exam over the next few months to establish a personal baseline.    3)  Her risk for breast cancer was calculated today and found to be a lifetime risk of 11.2% per tyrer cuzick model.  This is considered average risk.  We discussed those results.    Plan:    -right retroareolar US in the near future at [facility]  - clinic follow up in 3 months with exam only    I have advised the patient to continue monthly breast self examination and to notify us if she develops new breast symptoms.       ERICKA Liu          CC:  ERICKA Ortiz Susan, MD EMR Dragon/transcription disclaimer:  Dictated using Dragon dictation

## 2021-06-14 ENCOUNTER — TELEPHONE (OUTPATIENT)
Dept: SURGERY | Facility: CLINIC | Age: 56
End: 2021-06-14

## 2021-06-14 ENCOUNTER — OFFICE VISIT (OUTPATIENT)
Dept: SURGERY | Facility: CLINIC | Age: 56
End: 2021-06-14

## 2021-06-14 VITALS
OXYGEN SATURATION: 98 % | SYSTOLIC BLOOD PRESSURE: 124 MMHG | DIASTOLIC BLOOD PRESSURE: 82 MMHG | BODY MASS INDEX: 24.84 KG/M2 | HEIGHT: 62 IN | HEART RATE: 80 BPM | WEIGHT: 135 LBS

## 2021-06-14 DIAGNOSIS — Z80.3 FH: BREAST CANCER: ICD-10-CM

## 2021-06-14 DIAGNOSIS — N60.11 FIBROCYSTIC BREAST CHANGES, BILATERAL: ICD-10-CM

## 2021-06-14 DIAGNOSIS — N60.12 FIBROCYSTIC BREAST CHANGES, BILATERAL: ICD-10-CM

## 2021-06-14 DIAGNOSIS — N64.59 INVERSION OF RIGHT NIPPLE: Primary | ICD-10-CM

## 2021-06-14 PROCEDURE — 99203 OFFICE O/P NEW LOW 30 MIN: CPT | Performed by: NURSE PRACTITIONER

## 2021-06-14 RX ORDER — ESCITALOPRAM OXALATE 20 MG/1
20 TABLET ORAL DAILY
COMMUNITY
Start: 2021-04-20

## 2021-06-14 RX ORDER — IBUPROFEN 200 MG
200 TABLET ORAL
COMMUNITY

## 2021-06-14 RX ORDER — FERROUS SULFATE 325(65) MG
325 TABLET ORAL DAILY
COMMUNITY

## 2021-06-14 RX ORDER — ACETAMINOPHEN, ASPIRIN AND CAFFEINE 250; 250; 65 MG/1; MG/1; MG/1
TABLET, FILM COATED ORAL
COMMUNITY

## 2021-06-14 RX ORDER — MELATONIN
1000 DAILY
COMMUNITY

## 2021-07-15 ENCOUNTER — APPOINTMENT (OUTPATIENT)
Dept: WOMENS IMAGING | Facility: HOSPITAL | Age: 56
End: 2021-07-15

## 2021-07-15 PROCEDURE — 76642 ULTRASOUND BREAST LIMITED: CPT | Performed by: RADIOLOGY

## 2021-08-10 ENCOUNTER — APPOINTMENT (OUTPATIENT)
Dept: WOMENS IMAGING | Facility: HOSPITAL | Age: 56
End: 2021-08-10

## 2021-08-10 PROCEDURE — 77065 DX MAMMO INCL CAD UNI: CPT | Performed by: RADIOLOGY

## 2021-08-10 PROCEDURE — 77061 BREAST TOMOSYNTHESIS UNI: CPT | Performed by: RADIOLOGY

## 2021-08-10 PROCEDURE — G0279 TOMOSYNTHESIS, MAMMO: HCPCS | Performed by: RADIOLOGY

## 2021-09-14 ENCOUNTER — OFFICE VISIT (OUTPATIENT)
Dept: SURGERY | Facility: CLINIC | Age: 56
End: 2021-09-14

## 2021-09-14 VITALS
HEART RATE: 76 BPM | BODY MASS INDEX: 26.5 KG/M2 | SYSTOLIC BLOOD PRESSURE: 113 MMHG | TEMPERATURE: 98.6 F | OXYGEN SATURATION: 97 % | HEIGHT: 62 IN | DIASTOLIC BLOOD PRESSURE: 77 MMHG | WEIGHT: 144 LBS

## 2021-09-14 DIAGNOSIS — Z80.3 FH: BREAST CANCER: ICD-10-CM

## 2021-09-14 DIAGNOSIS — N60.12 FIBROCYSTIC BREAST CHANGES, BILATERAL: ICD-10-CM

## 2021-09-14 DIAGNOSIS — N60.11 FIBROCYSTIC BREAST CHANGES, BILATERAL: ICD-10-CM

## 2021-09-14 DIAGNOSIS — N64.59 INVERSION OF RIGHT NIPPLE: Primary | ICD-10-CM

## 2021-09-14 PROCEDURE — 99213 OFFICE O/P EST LOW 20 MIN: CPT | Performed by: NURSE PRACTITIONER

## 2021-09-14 NOTE — PROGRESS NOTES
[facility]     Referring Provider: ERICKA Goins      Chief complaint: Right inverted nipple     HPI: Ms. Ling Haas is a 54 yo woman, seen at the request of ERICKA Goins , for intermittent right nipple inversion.    I personally reviewed her records and summarized her relevant breast history/imaging:    She has no personal history of breast procedures.  She was diagnosed with early pancreatic cancer in .        She has a family history of breast cancer in her paternal aunt diagnosed at age 65, paternal cousin age 42, , paternal cousin 40's.   She denies any family history of ovarian cancer.     2021: Clinic visit with ERICKA Goins  Presents for well woman preventative exam.  Still has inverted nipple, had x-ray testing 2 years ago, patient concerned.  Clinical breast exam negative for abnormality  Concerned about inverted right nipple.  Started about 2 years ago she has had testing everything was okay, states she has been worrying more about it.  We will send to Dr. Morales at [facility] for consult.  Scheduled mammogram today.    2019: Right diagnostic [facility] with tomosynthesis[facility]  There are scattered areas of fibroglandular density.  As seen on mammogram, there is suggestion of horizontal crease in the mid nipple, usually benign finding.  No suspicious masses, suspicious microcalcifications or areas of architectural distortion are identified.  No retraction of the nipple or skin thickening is seen.  Ultrasound was performed for more complete evaluation.  Right breast complete ultrasound  There is no evidence of solid mass or abnormal cystic elements.  All 4 quadrants of the breast, axilla, retroareolar region were imaged.  Impression:  Area in the right breast is benign negative.  In view of negative findings, patient symptoms can be followed on a clinical basis,  Follow-up mammogram 1 year is  recommended.  BI-RADS Category 2    3/2/2020: Bilateral screening mammogram with tomosynthesis at [facility]  Scattered areas of fibroglandular density.  No suspicious masses, significant calcifications or other abnormalities are seen.    Impression:  There is no mammographic evidence of malignancy.  Screening mammogram 1 year is recommended.  BI-RADS Category 1    4/20/2021: Bilateral diagnostic mammogram with tomosynthesis at [facility]  Scattered areas of fibroglandular density.  There are areas of asymmetric breast tissue seen in both breasts.  No suspicious masses, suspicious microcalcifications or areas of architectural distortion are identified.  There is been no significant change from prior exam.  Impression:  Areas of asymmetric breast tissue in both breasts are benign negative.  Screening mammogram 1 year is recommended.  BI-RADS Category 2    6/14/2021:  Today she presents with intermittent right nipple inversion that occurs when she is warm.  She notes that first thing in the AM, her right nipple is deeply creased and as she becomes more active and certainly colder, it returns to normal, matching her left nipple.  She denies any breast lumps, pain, skin changes, or nipple discharge.  She has seen these changes over the past 2 years, has had negative imaging with US 2 years ago, no US since.  She denies any prior history of abnormal mammograms or breast biopsies.  We discussed the intermittent nipple creasing that she has noticed over the past 2 years.  Recent diagnostic mammogram was negative for abnormality, right retroareolar US was last completed in 2019 with negative findings.  She would feel more assured if another right retroareaolar US could confirm no abnormalities today since she continues to see the intermittent nipple creasing/inversion.  We will arrange for right retroareolar US at [facility] in the near future, I will call her with those results and see her in 3  months for exam.     9/14/21, Interval History:  She returns today for follow up with unchanged intermittent right nipple creasing, no changes or other concerns.    Right breast US on 7/15/21 was negative, BiRAds 0 pending right diagnostic mammogram. (see full report below)  On 8/10/21, right diagnostic mammogram with tomosynthesis was completed, BiRads 2. (see full report below)        Review of Systems   HENT:   Positive for tinnitus.    Musculoskeletal: Positive for back pain and flank pain.   Neurological: Positive for headaches.   Psychiatric/Behavioral: Positive for depression. The patient is nervous/anxious.    All other systems reviewed and are negative.      Medications:    Current Outpatient Medications:   •  aspirin-acetaminophen-caffeine (EXCEDRIN MIGRAINE) 250-250-65 MG per tablet, Take  by mouth., Disp: , Rfl:   •  cholecalciferol (Cholecalciferol) 25 MCG (1000 UT) tablet, Take 1,000 Units by mouth Daily., Disp: , Rfl:   •  Cholecalciferol (VITAMIN D PO), Take  by mouth., Disp: , Rfl:   •  escitalopram (LEXAPRO) 20 MG tablet, Take 20 mg by mouth Daily., Disp: , Rfl:   •  famotidine (PEPCID) 20 MG tablet, Take 20 mg by mouth 2 (Two) Times a Day., Disp: , Rfl:   •  ferrous sulfate 325 (65 FE) MG tablet, Take 325 mg by mouth Daily., Disp: , Rfl:   •  fexofenadine (ALLEGRA) 180 MG tablet, Take 180 mg by mouth Daily., Disp: , Rfl:   •  ibuprofen (ADVIL,MOTRIN) 200 MG tablet, Take 200 mg by mouth., Disp: , Rfl:   •  IRON PO, Take  by mouth., Disp: , Rfl:     Current Facility-Administered Medications:   •  nitroglycerin (NITROSTAT) SL tablet 0.4 mg, 0.4 mg, Sublingual, Q5 Min PRN, Lazarus Sánchez Jr., MD  •  sodium chloride 0.9 % flush 10 mL, 10 mL, Intravenous, PRN, Lazarus Sánchez Jr., MD    Allergies:  No Known Allergies    Medical history:  Past Medical History:   Diagnosis Date   • Anxiety    • Depression    • GERD (gastroesophageal reflux disease)    • Pancreas cyst    • Sleep apnea        Surgical  History:  Past Surgical History:   Procedure Laterality Date   • COLONOSCOPY N/A 3/30/2018    Procedure: COLONOSCOPY TO CECUM/TI TO POLYPECTOMY (HOT SNARE);  Surgeon: Jameel Artis MD;  Location: [facility] ENDOSCOPY;  Service: Gastroenterology   • DISTAL PANCREATECTOMY     • LAPAROSCOPIC CHOLECYSTECTOMY         Family History:  Family History   Problem Relation Age of Onset   • Heart attack Maternal Grandmother    • Breast cancer Paternal Aunt    • Prostate cancer Paternal Uncle    • Colon cancer Maternal Grandfather    • Breast cancer Paternal Cousin    • Breast cancer Paternal Cousin    • Prostate cancer Paternal Uncle        Social History:   Social History     Socioeconomic History   • Marital status:      Spouse name: Not on file   • Number of children: Not on file   • Years of education: Not on file   • Highest education level: Not on file   Tobacco Use   • Smoking status: Never Smoker   Substance and Sexual Activity   • Alcohol use: Yes     Comment: occasional   • Drug use: No   • Sexual activity: Defer     Patient drinks 2 servings of caffeine per day.       GYNECOLOGIC HISTORY:   G: 3. P: 2. AB: 1.  Last menstrual period: age 53  Age at menarche: 10  Age at first childbirth: 35  Lactation/How lon months  Age at menopause: 53  Total years of oral contraceptive use: 30  Total years of hormone replacement therapy: n/a      Physical Exam  Vitals:    21 0901   BP: 113/77   Pulse: 76   Temp: 98.6 °F (37 °C)   SpO2: 97%        I reviewed physical exam, no changes noted    ECOG 0 - Asymptomatic  General: NAD, well appearing  Psych: a&o x 3, normal mood and affect  MSK: normal gait, normal ROM in bilateral shoulders  Lymph nodes:  no cervical, supraclavicular or axillary lymphadenopathy  Breast: symmetric, diffusely nodular  Right:  No visible abnormalities on inspection while seated, with arms raised or hands on hips. No masses, skin changes, or nipple abnormalities.  No nipple creasing or  inversion noted today.  Left:  No visible abnormalities on inspection while seated, with arms raised or hands on hips. No masses, skin changes, or nipple abnormalities.    Imagin/15/21: right limited US at [facility]  There are no suspicious masses, areas of focal shadowing or distortion seen.  Impression:  Area in the right breast requires additional evaluation.  Mammogram is recommended.  There is no subareolar sonographic correlate to explain the intermittent nipple inversion.  If there is a concern for recent change, then diagnostic mammogram of the right breast, to include spot magnification views of the nipple and subareolar region, is recommended.  BI-RADS Category 0    8/10/2021: [facility]  Scattered areas of fibroglandular density.  There are no new suspicious masses, calcifications, or areas of architectural distortion.  There is no appreciable nipple retraction or skin changes.  The parenchymal pattern appears stable.  Impression:  There is no mammographic correlate to explain patient's intermittent nipple inversion.  Any further management of the symptoms should be based on findings at clinical assessment.  That is negative imaging should not preclude further evaluation or biopsy of suspicious clinical findings.  MRI can be considered for worrisome change or any persistent worrisome clinical findings.  Return to screening in 9 months is recommended.  Clinical follow-up recommended.  BI-RADS Category 2      Assessment:    1)  55 y.o. F with intermittent right nipple creasing/inversion, negative imaging  2)  Benign breast changes  3)  Family history of breast cancer, lifetime risk 11.2% per tyrer cuzick model    Discussion:  1)  Imaging findings were reviewed, a copy given.    2)  We discussed fibrocystic change and how this is benign and can sometimes be associated with increased breast density. I reassured her today that she had a  normal clinical breast exam and recent normal bilateral imaging. I explained that cords of dense breast tissue or focal areas of fibrocystic change can sometimes feel like a lump on exam. This is common in women like her with heterogeneous and nodular tissue. I encouraged her to become familiar with her own self-exam over the next few months to establish a personal baseline.    3)  Her risk for breast cancer was calculated and found to be a lifetime risk of 11.2% per tyrer cuzick model.  This is considered average risk.  We discussed those results.    Plan:    I am not given her a routine follow-up in our office but of asked her to check her self-exam and to call us in the interim with any concerns would be happy to see her back.    She will be due her screening mammogram in 3/2022.    I have advised the patient to continue monthly breast self examination and to notify us if she develops new breast symptoms.       ERICKA Liu          CC:  ERICKA Goins Susan, MD EMR Dragon/transcription disclaimer:  Dictated using Dragon dictation

## 2021-10-09 ENCOUNTER — IMMUNIZATION (OUTPATIENT)
Dept: VACCINE CLINIC | Facility: HOSPITAL | Age: 56
End: 2021-10-09

## 2021-10-09 PROCEDURE — 91300 HC SARSCOV02 VAC 30MCG/0.3ML IM: CPT | Performed by: INTERNAL MEDICINE

## 2021-10-09 PROCEDURE — 0004A ADM SARSCOV2 30MCG/0.3ML BOOSTER: CPT | Performed by: INTERNAL MEDICINE

## 2021-10-09 PROCEDURE — 0003A: CPT | Performed by: INTERNAL MEDICINE

## 2022-05-21 ENCOUNTER — IMMUNIZATION (OUTPATIENT)
Dept: VACCINE CLINIC | Facility: HOSPITAL | Age: 57
End: 2022-05-21

## 2022-05-21 DIAGNOSIS — Z23 NEED FOR VACCINATION: Primary | ICD-10-CM

## 2022-05-21 PROCEDURE — 91305 HC SARSCOV2 VAC 30 MCG TRS-SUCR PFIZER: CPT | Performed by: INTERNAL MEDICINE

## 2022-05-21 PROCEDURE — 0054A HC ADM SARSCV2 30MCG TRS-SUCR BOOSTER: CPT | Performed by: INTERNAL MEDICINE

## 2022-05-24 ENCOUNTER — APPOINTMENT (OUTPATIENT)
Dept: WOMENS IMAGING | Facility: HOSPITAL | Age: 57
End: 2022-05-24

## 2022-05-24 PROCEDURE — 77063 BREAST TOMOSYNTHESIS BI: CPT | Performed by: RADIOLOGY

## 2022-05-24 PROCEDURE — 77067 SCR MAMMO BI INCL CAD: CPT | Performed by: RADIOLOGY

## 2022-12-27 ENCOUNTER — DOCUMENTATION (OUTPATIENT)
Dept: GASTROENTEROLOGY | Facility: CLINIC | Age: 57
End: 2022-12-27

## 2023-05-10 ENCOUNTER — TELEPHONE (OUTPATIENT)
Dept: GASTROENTEROLOGY | Facility: CLINIC | Age: 58
End: 2023-05-10

## 2023-05-10 NOTE — TELEPHONE ENCOUNTER
Caller: Ling Haas    Relationship to patient: Self    Best call back number: [phone]    Type of visit: COLONOSCOPY    Requested date: MID TO LATE July IF POSSIBLE    Additional notes: PT CALLING TO SCHEDULE SCOPE FROM 5 YR RECALL. CALL BACK ANYTIME OK TO M.

## 2023-05-11 ENCOUNTER — PRE-PROCEDURE SCREENING (OUTPATIENT)
Dept: GASTROENTEROLOGY | Facility: CLINIC | Age: 58
End: 2023-05-11
Payer: OTHER GOVERNMENT

## 2023-05-11 NOTE — TELEPHONE ENCOUNTER
LAST SCOPE 3/18 IN Epic --Personal history of polyps--Family  history  of polyps AND  ( DAD)  -Family history of  colon cancer ( GRANDFATHER)--ASPIRIN--Medications:              albuterol sulfate  (90 Base) MCG/ACT inhaler  aspirin-acetaminophen-caffeine (EXCEDRIN MIGRAINE) 250-250-65 MG per tablet  cholecalciferol (Cholecalciferol) 25 MCG (1000 UT) tablet  Cholecalciferol (VITAMIN D PO)  escitalopram (LEXAPRO) 20 MG tablet  famotidine (PEPCID) 20 MG tablet  ferrous sulfate 325 (65 FE) MG tablet  fexofenadine (ALLEGRA) 180 MG tablet  guaiFENesin-codeine (guaiFENesin AC) 100-10 MG/5ML liquid  ibuprofen (ADVIL,MOTRIN) 200 MG tablet  IRON PO           QUESTIONNAIRE SCREENING  HAS BEEN SENT TO DOCTOR FOR REVIEW

## 2023-05-23 ENCOUNTER — PREP FOR SURGERY (OUTPATIENT)
Dept: OTHER | Facility: HOSPITAL | Age: 58
End: 2023-05-23
Payer: OTHER GOVERNMENT

## 2023-05-23 DIAGNOSIS — Z86.010 HISTORY OF ADENOMATOUS POLYP OF COLON: Primary | ICD-10-CM

## 2023-06-08 ENCOUNTER — TELEPHONE (OUTPATIENT)
Dept: GASTROENTEROLOGY | Facility: CLINIC | Age: 58
End: 2023-06-08
Payer: OTHER GOVERNMENT

## 2023-06-08 NOTE — TELEPHONE ENCOUNTER
Caller: Ling Haas    Relationship to patient: Self    Best call back number:[phone]    Patient is needing: TO SCHEDULE GASTRO PROCEDURE

## 2023-06-12 PROBLEM — Z86.010 HISTORY OF ADENOMATOUS POLYP OF COLON: Status: ACTIVE | Noted: 2023-06-12

## 2023-06-12 PROBLEM — Z86.0101 HISTORY OF ADENOMATOUS POLYP OF COLON: Status: ACTIVE | Noted: 2023-06-12

## 2023-06-12 NOTE — TELEPHONE ENCOUNTER
OK FOR HUB TO READ  PT IS [facility]

## 2023-09-14 ENCOUNTER — OFFICE VISIT (OUTPATIENT)
Dept: SLEEP MEDICINE | Facility: HOSPITAL | Age: 58
End: 2023-09-14
Payer: OTHER GOVERNMENT

## 2023-09-14 VITALS — BODY MASS INDEX: 25.52 KG/M2 | HEART RATE: 83 BPM | WEIGHT: 144 LBS | OXYGEN SATURATION: 96 % | HEIGHT: 63 IN

## 2023-09-14 DIAGNOSIS — G47.14 HYPERSOMNIA DUE TO MEDICAL CONDITION: ICD-10-CM

## 2023-09-14 DIAGNOSIS — G47.33 OSA (OBSTRUCTIVE SLEEP APNEA): Primary | ICD-10-CM

## 2023-09-14 PROCEDURE — G0463 HOSPITAL OUTPT CLINIC VISIT: HCPCS

## 2023-09-14 NOTE — PROGRESS NOTES
Sleep Disorders Center New Patient/Consultation       Reason for Consultation: SHANON    Patient Care Team:  Connie Aguilar MD as PCP - General (Internal Medicine)  Abrahan Spring MD as Consulting Physician (Sleep Medicine)    Chief complaint: SHANON    History of present illness:    Thank you for asking me to see your patient.  The patient is a 57 y.o. female who was diagnosed with moderate severity obstructive sleep apnea with sleep-related hypoxia by home sleep study 8/18/2016.  Previously, she had been using auto CPAP.  Several years ago, the patient stopped using CPAP because she could not stand the hose interfering with her sleep.  Due to persistent complaints of snoring and hypersomnolence with bouts of rapid heart rate at times, the patient is here for reevaluation.    The patient goes to bed between 10:30 PM and 11:30 PM and gets out of bed between 8:30 AM and 9:15 AM.  She does not feel good upon arising.  Sometimes, on weekends, she will take up to 2 Naps.  Her [place] Sleepiness Scale is normal at 6.  She does snore.  She has awaken coughing.  Witnessed apnea previously noted.  She has morning headaches.  She does have some complaints of RLS 2 to 3 hours prior to going to bed.  She clenches her teeth.  She has complaints of RANDI during the nighttime.  She will use the restroom once during the nighttime.  About once a month, she does awaken screaming.    Review of Systems:    A complete review of systems was done and all were negative with the exception of occasional dizziness and some anxiety and depression and frequent heartburn    History:  Past Medical History:   Diagnosis Date    Anxiety     Depression     GERD (gastroesophageal reflux disease)     SHANON (obstructive sleep apnea) 08/18/2016    Home sleep study.  Weight 144 pounds.  Moderate SHANON with AHI 21 events per hour.    Pancreas cyst    ,   Past Surgical History:   Procedure Laterality Date    COLONOSCOPY N/A 3/30/2018    Procedure: COLONOSCOPY  "TO CECUM/TI TO POLYPECTOMY (HOT SNARE);  Surgeon: Jameel Artis MD;  Location: [facility] ENDOSCOPY;  Service: Gastroenterology    DISTAL PANCREATECTOMY      LAPAROSCOPIC CHOLECYSTECTOMY     ,   Family History   Problem Relation Age of Onset    Heart attack Maternal Grandmother     Breast cancer Paternal Aunt     Prostate cancer Paternal Uncle     Colon cancer Maternal Grandfather     Breast cancer Paternal Cousin     Breast cancer Paternal Cousin     Prostate cancer Paternal Uncle    , and   Social History     Socioeconomic History    Marital status:    Tobacco Use    Smoking status: Never    Smokeless tobacco: Never   Vaping Use    Vaping Use: Never used   Substance and Sexual Activity    Alcohol use: Yes     Comment: 3/week    Drug use: No    Sexual activity: Defer     E-cigarette/Vaping    E-cigarette/Vaping Use Never User      E-cigarette/Vaping Substances     E-cigarette/Vaping Devices      Social History: Retired 1 or 2 caffeinated beverages a day    Allergies:  Patient has no known allergies.     Medication Review: Lexapro Allegra Pepcid AC Excedrin Migraine    Vital Signs:    Vitals:    09/14/23 1100   Pulse: 83   SpO2: 96%   Weight: 65.3 kg (144 lb)   Height: 158.8 cm (62.5\")      Body mass index is 25.92 kg/m².  Neck Circumference: 13.5 inches      Physical Exam:    Constitutional:  Well developed 57 y.o. female that appears in no apparent distress.  Awake & oriented times 3.  Normal mood with normal recent and remote memory and normal judgement.  Eyes:  Conjunctivae normal.  Oropharynx: Moist mucous membranes without exudate and a normal-sized tongue and uvula and moderate narrowing of the posterior pharyngeal opening class II Mallampati airway.    Neck: Trachea midline  Respiratory: Effort is not labored  Cardiovascular: Radial pulse regular  Musculoskeletal: Gait appears normal, no digital clubbing evident, no pre-tibial edema    Results Review: I reviewed the results of the previous home " sleep study.    Impression:   Moderate obstructive sleep apnea by home sleep study 8/18/2016, weight 144 pounds.  Previously treated with CPAP.  Patient has not used CPAP for several years.  Patient demonstrates symptoms and signs consistent with SHANON and has complaints of hypersomnolence with a normal [place] Sleepiness Scale of 6.    Plan:  Good sleep hygiene measures should be maintained.      Pathophysiology of SHANON described to the patient.  Cardiovascular complications of untreated SHANON also reviewed.      After reviewing all with the patient, I would recommend obtaining an up-to-date home sleep study to reevaluate severity of SHANON present.  Since the patient has last been treated, there are headgear which has the tubing coming off the top of the head and not down in front of her.  She feels that she may be able to use this?  I also reviewed an oral appliance.  I answered all of her questions.  Home sleep study will be scheduled and further recommendations will be made once those results are known.    Thank you for requesting me to assist in this patient's care.    Abrahan Spring MD  Sleep Medicine  09/14/23  11:12 EDT

## 2023-09-17 PROBLEM — G47.14 HYPERSOMNIA DUE TO MEDICAL CONDITION: Status: ACTIVE | Noted: 2023-09-17

## 2023-09-18 ENCOUNTER — TELEPHONE (OUTPATIENT)
Dept: GASTROENTEROLOGY | Facility: CLINIC | Age: 58
End: 2023-09-18
Payer: OTHER GOVERNMENT

## 2023-09-20 ENCOUNTER — ANESTHESIA (OUTPATIENT)
Dept: GASTROENTEROLOGY | Facility: HOSPITAL | Age: 58
End: 2023-09-20
Payer: OTHER GOVERNMENT

## 2023-09-20 ENCOUNTER — ANESTHESIA EVENT (OUTPATIENT)
Dept: GASTROENTEROLOGY | Facility: HOSPITAL | Age: 58
End: 2023-09-20
Payer: OTHER GOVERNMENT

## 2023-09-20 ENCOUNTER — HOSPITAL ENCOUNTER (OUTPATIENT)
Facility: HOSPITAL | Age: 58
Setting detail: HOSPITAL OUTPATIENT SURGERY
Discharge: HOME | End: 2023-09-20
Attending: INTERNAL MEDICINE | Admitting: INTERNAL MEDICINE

## 2023-09-20 VITALS
DIASTOLIC BLOOD PRESSURE: 82 MMHG | HEART RATE: 74 BPM | OXYGEN SATURATION: 99 % | SYSTOLIC BLOOD PRESSURE: 116 MMHG | BODY MASS INDEX: 25.95 KG/M2 | RESPIRATION RATE: 16 BRPM | HEIGHT: 62 IN | TEMPERATURE: 98 F | WEIGHT: 141 LBS

## 2023-09-20 DIAGNOSIS — Z86.010 HISTORY OF ADENOMATOUS POLYP OF COLON: ICD-10-CM

## 2023-09-20 PROCEDURE — 45385 COLONOSCOPY W/LESION REMOVAL: CPT | Performed by: INTERNAL MEDICINE

## 2023-09-20 PROCEDURE — S0260 H&P FOR SURGERY: HCPCS | Performed by: INTERNAL MEDICINE

## 2023-09-20 PROCEDURE — 88305 TISSUE EXAM BY PATHOLOGIST: CPT | Performed by: INTERNAL MEDICINE

## 2023-09-20 PROCEDURE — 25010000002 PROPOFOL 10 MG/ML EMULSION: Performed by: ANESTHESIOLOGY

## 2023-09-20 RX ORDER — LIDOCAINE HYDROCHLORIDE 10 MG/ML
0.5 INJECTION, SOLUTION INFILTRATION; PERINEURAL ONCE AS NEEDED
Status: DISCONTINUED | OUTPATIENT
Start: 2023-09-20 | End: 2023-09-20 | Stop reason: HOSPADM

## 2023-09-20 RX ORDER — PROPOFOL 10 MG/ML
VIAL (ML) INTRAVENOUS CONTINUOUS PRN
Status: DISCONTINUED | OUTPATIENT
Start: 2023-09-20 | End: 2023-09-20 | Stop reason: SURG

## 2023-09-20 RX ORDER — SODIUM CHLORIDE 0.9 % (FLUSH) 0.9 %
10 SYRINGE (ML) INJECTION AS NEEDED
Status: DISCONTINUED | OUTPATIENT
Start: 2023-09-20 | End: 2023-09-20 | Stop reason: HOSPADM

## 2023-09-20 RX ORDER — SODIUM CHLORIDE, SODIUM LACTATE, POTASSIUM CHLORIDE, CALCIUM CHLORIDE 600; 310; 30; 20 MG/100ML; MG/100ML; MG/100ML; MG/100ML
1000 INJECTION, SOLUTION INTRAVENOUS CONTINUOUS
Status: DISCONTINUED | OUTPATIENT
Start: 2023-09-20 | End: 2023-09-20 | Stop reason: HOSPADM

## 2023-09-20 RX ADMIN — PROPOFOL 50 MCG/KG/MIN: 10 INJECTION, EMULSION INTRAVENOUS at 08:29

## 2023-09-20 RX ADMIN — SODIUM CHLORIDE, POTASSIUM CHLORIDE, SODIUM LACTATE AND CALCIUM CHLORIDE 1000 ML: 600; 310; 30; 20 INJECTION, SOLUTION INTRAVENOUS at 08:07

## 2023-09-20 NOTE — H&P
[facility]  Pre Procedure History & Physical    Chief Complaint:   History of colon polyps    Subjective     HPI:   Patient 57-year-old female with history of reflux, obstructive sleep apnea pancreatic cyst presenting for surveillance.  Patient with history colon polyps here for colonoscopy    Past Medical History:   Past Medical History:   Diagnosis Date    Anxiety     Depression     GERD (gastroesophageal reflux disease)     History of adenomatous polyp of colon     Hypersomnia due to medical condition     SHANON (obstructive sleep apnea) 08/18/2016    Home sleep study.  Weight 144 pounds.  Moderate SHANON with AHI 21 events per hour.    Pancreas cyst        Past Surgical History:  Past Surgical History:   Procedure Laterality Date    COLONOSCOPY N/A 3/30/2018    Procedure: COLONOSCOPY TO CECUM/TI TO POLYPECTOMY (HOT SNARE);  Surgeon: Jameel Artis MD;  Location: [facility] ENDOSCOPY;  Service: Gastroenterology    DISTAL PANCREATECTOMY      LAPAROSCOPIC CHOLECYSTECTOMY         Family History:  Family History   Problem Relation Age of Onset    Breast cancer Paternal Aunt     Prostate cancer Paternal Uncle     Prostate cancer Paternal Uncle     Heart attack Maternal Grandmother     Colon cancer Maternal Grandfather     Breast cancer Paternal Cousin     Breast cancer Paternal Cousin     Malig Hyperthermia Neg Hx        Social History:   reports that she has never smoked. She has never used smokeless tobacco. She reports current alcohol use. She reports that she does not use drugs.    Medications:   Facility-Administered Medications Prior to Admission   Medication Dose Route Frequency Provider Last Rate Last Admin    nitroglycerin (NITROSTAT) SL tablet 0.4 mg  0.4 mg Sublingual Q5 Min PRN Lazarus Sánchez Jr., MD        [DISCONTINUED] sodium chloride 0.9 % flush 10 mL  10 mL Intravenous PRN Lazarus Sánchez Jr., MD         Medications Prior to Admission   Medication Sig Dispense Refill Last Dose     "aspirin-acetaminophen-caffeine (EXCEDRIN MIGRAINE) 250-250-65 MG per tablet Take  by mouth As Needed.   9/19/2023    escitalopram (LEXAPRO) 20 MG tablet Take 1 tablet by mouth Daily.       famotidine (PEPCID) 20 MG tablet Take 1 tablet by mouth 2 (Two) Times a Day.       ferrous sulfate 325 (65 FE) MG tablet Take 1 tablet by mouth Daily.       fexofenadine (ALLEGRA) 180 MG tablet Take 1 tablet by mouth Daily.       ibuprofen (ADVIL,MOTRIN) 200 MG tablet Take 1 tablet by mouth As Needed.   Past Week    IRON PO Take  by mouth. PT STATES NOT TAKEN FOR A FEW WEEKS   Past Month    cholecalciferol (VITAMIN D3) 25 MCG (1000 UT) tablet Take 1 tablet by mouth Daily. PT STATES NOT TAKEN FOR A FEW WEEKS          Allergies:  Patient has no known allergies.    ROS:    Pertinent items are noted in HPI     Objective     Blood pressure 113/75, pulse 71, resp. rate 14, height 157.5 cm (62\"), weight 64 kg (141 lb), SpO2 98 %.    Physical Exam   Constitutional: Pt is oriented to person, place, and time and well-developed, well-nourished, and in no distress.   Mouth/Throat: Oropharynx is clear and moist.   Neck: Normal range of motion.   Cardiovascular: Normal rate, regular rhythm and normal heart sounds.    Pulmonary/Chest: Effort normal and breath sounds normal.   Abdominal: Soft. Nontender  Skin: Skin is warm and dry.   Psychiatric: Mood, memory, affect and judgment normal.     Assessment & Plan     Diagnosis:  History of colon polyps    Anticipated Surgical Procedure:  Colonoscopy    The risks, benefits, and alternatives of this procedure have been discussed with the patient or the responsible party- the patient understands and agrees to proceed.                                                          "

## 2023-09-20 NOTE — ANESTHESIA PREPROCEDURE EVALUATION
Anesthesia Evaluation     Patient summary reviewed and Nursing notes reviewed                Airway   Mallampati: II  TM distance: >3 FB  Neck ROM: full  Dental      Pulmonary - negative pulmonary ROS   Cardiovascular - negative cardio ROS    Rhythm: regular  Rate: normal        Neuro/Psych  (+) psychiatric history Anxiety and Depression  GI/Hepatic/Renal/Endo    (+) GERD    Musculoskeletal (-) negative ROS    Abdominal    Substance History - negative use     OB/GYN negative ob/gyn ROS         Other                      Anesthesia Plan    ASA 2     MAC     intravenous induction     Anesthetic plan, risks, benefits, and alternatives have been provided, discussed and informed consent has been obtained with: patient.    CODE STATUS:

## 2023-09-20 NOTE — BRIEF OP NOTE
COLONOSCOPY  Progress Note    Ling Haas  9/20/2023    Pre-op Diagnosis:   History of adenomatous polyp of colon [Z86.010]       Post-Op Diagnosis Codes:     * History of adenomatous polyp of colon [Z86.010]     * Colon polyp [K63.5]     * Diverticulosis [K57.90]     * Internal hemorrhoids [K64.8]    Procedure/CPT® Codes:        Procedure(s):  COLONOSCOPY to cecum with cold snare polypectomy              Surgeon(s):  Jameel Artis MD    Anesthesia: Monitored Anesthesia Care    Staff:   Endo Technician: Mikayla Dorado  Endo Nurse: Lara Benavidez RN; Melisa Newsome RN         Estimated Blood Loss: minimal    Urine Voided: * No values recorded between 9/20/2023  8:27 AM and 9/20/2023  9:04 AM *    Specimens:                Specimens       ID Source Type Tests Collected By Collected At Frozen?    A Large Intestine, Sigmoid Colon Polyp TISSUE PATHOLOGY EXAM   Jameel Artis MD 9/20/23 0901     Description: sigmoid polyp    This specimen was not marked as sent.                  Drains: * No LDAs found *    Findings: Colonoscopy to the terminal ileum with normal ileum mucosa.  Sigmoid diverticulosis was noted with internal hemorrhoids.  Single sigmoid polyp removed cold snare.          Complications: None          Jameel Artis MD     Date: 9/20/2023  Time: 09:04 EDT         Last refill 5/23  Last visit 11/1  Last Chace 5/23  Next visit 9/9

## 2023-09-20 NOTE — ANESTHESIA POSTPROCEDURE EVALUATION
Patient: Ling Haas    Procedure Summary       Date: 09/20/23 Room / Location: [facility] ENDOSCOPY 1 / [facility] ENDOSCOPY    Anesthesia Start: 0828 Anesthesia Stop: 0905    Procedure: COLONOSCOPY to cecum with cold snare polypectomy Diagnosis:       History of adenomatous polyp of colon      Colon polyp      Diverticulosis      Internal hemorrhoids      (History of adenomatous polyp of colon [Z86.010])    Surgeons: Jameel Artis MD Provider: Odin Hernandez MD    Anesthesia Type: MAC ASA Status: 2            Anesthesia Type: MAC    Vitals  No vitals data found for the desired time range.          Post Anesthesia Care and Evaluation    Patient location during evaluation: PACU  Patient participation: complete - patient participated  Level of consciousness: awake and alert  Pain management: adequate    Airway patency: patent  Anesthetic complications: No anesthetic complications    Cardiovascular status: acceptable  Respiratory status: acceptable  Hydration status: acceptable    Comments: --------------------            09/20/23               0802     --------------------   BP:       113/75     Pulse:      71       Resp:       14       SpO2:      98%      --------------------

## 2023-09-22 LAB
LAB AP CASE REPORT: NORMAL
PATH REPORT.FINAL DX SPEC: NORMAL
PATH REPORT.GROSS SPEC: NORMAL

## 2023-09-28 ENCOUNTER — HOSPITAL ENCOUNTER (OUTPATIENT)
Dept: SLEEP MEDICINE | Facility: HOSPITAL | Age: 58
End: 2023-09-28
Payer: OTHER GOVERNMENT

## 2023-09-28 DIAGNOSIS — G47.33 OSA (OBSTRUCTIVE SLEEP APNEA): ICD-10-CM

## 2023-09-28 PROCEDURE — 95806 SLEEP STUDY UNATT&RESP EFFT: CPT

## 2023-10-17 ENCOUNTER — TELEPHONE (OUTPATIENT)
Dept: SLEEP MEDICINE | Facility: HOSPITAL | Age: 58
End: 2023-10-17
Payer: OTHER GOVERNMENT

## 2023-11-20 ENCOUNTER — TELEPHONE (OUTPATIENT)
Dept: GASTROENTEROLOGY | Facility: CLINIC | Age: 58
End: 2023-11-20
Payer: OTHER GOVERNMENT

## 2023-11-20 NOTE — TELEPHONE ENCOUNTER
Call to patient per Dr. Artis's results and recommendations.   Patient verbalized understanding       ----- Message from Jameel Artis MD sent at 11/20/2023  3:10 PM EST -----  Polyp benign repeat: 5 years as discussed

## 2024-04-01 ENCOUNTER — HOSPITAL ENCOUNTER (OUTPATIENT)
Facility: HOSPITAL | Age: 59
Discharge: HOME | End: 2024-04-01
Attending: EMERGENCY MEDICINE | Admitting: EMERGENCY MEDICINE
Payer: OTHER GOVERNMENT

## 2024-04-01 ENCOUNTER — APPOINTMENT (OUTPATIENT)
Dept: GENERAL RADIOLOGY | Facility: HOSPITAL | Age: 59
End: 2024-04-01
Payer: OTHER GOVERNMENT

## 2024-04-01 VITALS
HEIGHT: 63 IN | WEIGHT: 140 LBS | RESPIRATION RATE: 15 BRPM | TEMPERATURE: 98.5 F | BODY MASS INDEX: 24.8 KG/M2 | DIASTOLIC BLOOD PRESSURE: 91 MMHG | HEART RATE: 89 BPM | OXYGEN SATURATION: 97 % | SYSTOLIC BLOOD PRESSURE: 138 MMHG

## 2024-04-01 DIAGNOSIS — M54.50 LUMBAR BACK PAIN: Primary | ICD-10-CM

## 2024-04-01 DIAGNOSIS — M54.16 LUMBAR RADICULOPATHY: ICD-10-CM

## 2024-04-01 DIAGNOSIS — M51.34 DEGENERATIVE DISC DISEASE, THORACIC: ICD-10-CM

## 2024-04-01 DIAGNOSIS — S39.012A STRAIN OF LUMBAR REGION, INITIAL ENCOUNTER: ICD-10-CM

## 2024-04-01 PROCEDURE — G0463 HOSPITAL OUTPT CLINIC VISIT: HCPCS | Performed by: PHYSICIAN ASSISTANT

## 2024-04-01 PROCEDURE — 72110 X-RAY EXAM L-2 SPINE 4/>VWS: CPT

## 2024-04-01 PROCEDURE — 25010000002 KETOROLAC TROMETHAMINE PER 15 MG: Performed by: PHYSICIAN ASSISTANT

## 2024-04-01 PROCEDURE — 72220 X-RAY EXAM SACRUM TAILBONE: CPT

## 2024-04-01 RX ORDER — METHYLPREDNISOLONE 4 MG/1
TABLET ORAL
Qty: 21 TABLET | Refills: 0 | Status: SHIPPED | OUTPATIENT
Start: 2024-04-01

## 2024-04-01 RX ORDER — KETOROLAC TROMETHAMINE 15 MG/ML
15 INJECTION, SOLUTION INTRAMUSCULAR; INTRAVENOUS ONCE
Status: COMPLETED | OUTPATIENT
Start: 2024-04-01 | End: 2024-04-01

## 2024-04-01 RX ORDER — CYCLOBENZAPRINE HCL 10 MG
10 TABLET ORAL 3 TIMES DAILY PRN
Qty: 12 TABLET | Refills: 0 | Status: SHIPPED | OUTPATIENT
Start: 2024-04-01 | End: 2024-04-06

## 2024-04-01 RX ADMIN — KETOROLAC TROMETHAMINE 15 MG: 15 INJECTION, SOLUTION INTRAMUSCULAR; INTRAVENOUS at 15:20

## 2024-04-01 NOTE — FSED PROVIDER NOTE
Subjective   History of Present Illness    Patient reports that she forgot her keys inside her house boat this morning, try to get in her boat through the window, when she was able to put her left leg through the window and her right foot got caught outside of the house boat.  At that time patient fell, overextending her right hip/low back.  She is now here for right-sided low back pain that radiates down her right thigh.  Denies any urinary or bowel incontinence.  Denies any previous injury to the area.    Review of Systems   Constitutional:  Negative for activity change and appetite change.   Eyes:  Negative for pain.   Respiratory:  Negative for shortness of breath.    Gastrointestinal:  Negative for nausea and vomiting.   Musculoskeletal:  Positive for back pain. Negative for arthralgias.   Skin:  Negative for color change.   Neurological:  Negative for dizziness.   All other systems reviewed and are negative.      Past Medical History:   Diagnosis Date    Anxiety     Depression     GERD (gastroesophageal reflux disease)     History of adenomatous polyp of colon     Hypersomnia due to medical condition     SAHNON (obstructive sleep apnea) 08/18/2016    Home sleep study.  Weight 144 pounds.  Moderate SHANON with AHI 21 events per hour.    Pancreas cyst        No Known Allergies    Past Surgical History:   Procedure Laterality Date    COLONOSCOPY N/A 3/30/2018    Procedure: COLONOSCOPY TO CECUM/TI TO POLYPECTOMY (HOT SNARE);  Surgeon: Jameel Artis MD;  Location: [facility] ENDOSCOPY;  Service: Gastroenterology    COLONOSCOPY N/A 9/20/2023    Procedure: COLONOSCOPY to cecum with cold snare polypectomy;  Surgeon: Jameel Artis MD;  Location: [facility] ENDOSCOPY;  Service: Gastroenterology;  Laterality: N/A;  PRE- HISTORY OF COLON POLYPS  POST- DIVERTICULOSIS, polyp, hemorrhoids    DISTAL PANCREATECTOMY      LAPAROSCOPIC CHOLECYSTECTOMY         Family History   Problem Relation Age of Onset    Breast cancer Paternal  Aunt     Prostate cancer Paternal Uncle     Prostate cancer Paternal Uncle     Heart attack Maternal Grandmother     Colon cancer Maternal Grandfather     Breast cancer Paternal Cousin     Breast cancer Paternal Cousin     Malig Hyperthermia Neg Hx        Social History     Socioeconomic History    Marital status:    Tobacco Use    Smoking status: Never    Smokeless tobacco: Never   Vaping Use    Vaping status: Never Used   Substance and Sexual Activity    Alcohol use: Yes     Comment: 3/week    Drug use: No    Sexual activity: Defer           Objective   Physical Exam  Vitals and nursing note reviewed.   Constitutional:       Appearance: Normal appearance. She is normal weight.   HENT:      Head: Normocephalic and atraumatic.      Nose: Nose normal.      Mouth/Throat:      Mouth: Mucous membranes are moist.   Eyes:      Pupils: Pupils are equal, round, and reactive to light.   Cardiovascular:      Rate and Rhythm: Normal rate and regular rhythm.      Pulses: Normal pulses.      Heart sounds: Normal heart sounds.   Pulmonary:      Effort: Pulmonary effort is normal.      Breath sounds: Normal breath sounds.   Musculoskeletal:         General: Normal range of motion.      Cervical back: Normal range of motion.      Right lower leg: No edema.      Left lower leg: No edema.      Comments: low back: There is a superficial abrasion noted    Lumbar spine: Right paraspinal tenderness, no midline tenderness    Right coccyx: Tenderness to palpation    No saddle anesthesia     Skin:     General: Skin is warm.   Neurological:      General: No focal deficit present.      Mental Status: She is alert.   Psychiatric:         Behavior: Behavior is cooperative.         Procedures           ED Course  ED Course as of 04/01/24 1733   Mon Apr 01, 2024   1543 I rechecked patient and she reports feeling better after the Toradol.  Advised if her pain continues to follow-up with her primary care doctor back talk. [SS]      ED Course  User Index  [SS] Carla Sheikh PA-C                                           Medical Decision Making  Problems Addressed:  Degenerative disc disease, thoracic: complicated acute illness or injury  Lumbar back pain: complicated acute illness or injury  Lumbar radiculopathy: complicated acute illness or injury  Strain of lumbar region, initial encounter: complicated acute illness or injury    Amount and/or Complexity of Data Reviewed  Radiology: ordered.    Risk  Prescription drug management.        Final diagnoses:   Degenerative disc disease, thoracic   Lumbar back pain   Lumbar radiculopathy   Strain of lumbar region, initial encounter       ED Disposition  ED Disposition       ED Disposition   Discharge    Condition   Stable    Comment   --               Connie Aguilar MD  [address] [address]  [phone]               Medication List        New Prescriptions      cyclobenzaprine 10 MG tablet  Commonly known as: FLEXERIL  Take 1 tablet by mouth 3 (Three) Times a Day As Needed for Muscle Spasms for up to 5 days.     methylPREDNISolone 4 MG dose pack  Commonly known as: MEDROL  Take as directed on package instructions.               Where to Get Your Medications        These medications were sent to [id number] - [place], [place] - [address] AT [address] - [phone]  - [phone]   [address], [facility] [address]      Phone: [phone]   cyclobenzaprine 10 MG tablet  methylPREDNISolone 4 MG dose pack

## 2024-04-01 NOTE — DISCHARGE INSTRUCTIONS
Please take the medications as prescribed.  Follow-up with your primary care doctor next week to recheck your symptoms.  Light activity for the next 2 to 3 days.

## 2024-10-01 ENCOUNTER — APPOINTMENT (OUTPATIENT)
Dept: WOMENS IMAGING | Facility: HOSPITAL | Age: 59
End: 2024-10-01
Payer: OTHER GOVERNMENT

## 2024-10-01 PROCEDURE — 77067 SCR MAMMO BI INCL CAD: CPT | Performed by: RADIOLOGY

## 2024-10-01 PROCEDURE — 77063 BREAST TOMOSYNTHESIS BI: CPT | Performed by: RADIOLOGY

## (undated) DEVICE — TUBING, SUCTION, 1/4" X 10', STRAIGHT: Brand: MEDLINE

## (undated) DEVICE — SNAR POLYP SENSATION STDOVL 27 240 BX40

## (undated) DEVICE — KT ORCA ORCAPOD DISP STRL

## (undated) DEVICE — CANN NASL CO2 TRULINK W/O2 A/

## (undated) DEVICE — SENSR O2 OXIMAX FNGR A/ 18IN NONSTR

## (undated) DEVICE — THE TORRENT IRRIGATION SCOPE CONNECTOR IS USED WITH THE TORRENT IRRIGATION TUBING TO PROVIDE IRRIGATION FLUIDS SUCH AS STERILE WATER DURING GASTROINTESTINAL ENDOSCOPIC PROCEDURES WHEN USED IN CONJUNCTION WITH AN IRRIGATION PUMP (OR ELECTROSURGICAL UNIT).: Brand: TORRENT

## (undated) DEVICE — CANN O2 ETCO2 FITS ALL CONN CO2 SMPL A/ 7IN DISP LF

## (undated) DEVICE — SNAR POLYP CAPTIVATOR RND STFF 2.4 240CM 10MM 1P/U

## (undated) DEVICE — THE SINGLE USE ETRAP – POLYP TRAP IS USED FOR SUCTION RETRIEVAL OF ENDOSCOPICALLY REMOVED POLYPS.: Brand: ETRAP

## (undated) DEVICE — Device: Brand: DEFENDO AIR/WATER/SUCTION AND BIOPSY VALVE

## (undated) DEVICE — ADAPT CLN BIOGUARD AIR/H2O DISP

## (undated) DEVICE — LN SMPL CO2 SHTRM SD STREAM W/M LUER